# Patient Record
Sex: FEMALE | Race: WHITE | Employment: OTHER | URBAN - METROPOLITAN AREA
[De-identification: names, ages, dates, MRNs, and addresses within clinical notes are randomized per-mention and may not be internally consistent; named-entity substitution may affect disease eponyms.]

---

## 2020-08-24 ENCOUNTER — APPOINTMENT (OUTPATIENT)
Dept: CT IMAGING | Age: 65
DRG: 312 | End: 2020-08-24
Payer: MEDICARE

## 2020-08-24 ENCOUNTER — APPOINTMENT (OUTPATIENT)
Dept: GENERAL RADIOLOGY | Age: 65
DRG: 312 | End: 2020-08-24
Payer: MEDICARE

## 2020-08-24 ENCOUNTER — HOSPITAL ENCOUNTER (OUTPATIENT)
Age: 65
Setting detail: OBSERVATION
Discharge: HOME | DRG: 312 | End: 2020-08-27
Attending: EMERGENCY MEDICINE | Admitting: STUDENT IN AN ORGANIZED HEALTH CARE EDUCATION/TRAINING PROGRAM
Payer: MEDICARE

## 2020-08-24 PROBLEM — R07.9 CHEST PAIN: Status: ACTIVE | Noted: 2020-08-24

## 2020-08-24 LAB
ALBUMIN SERPL-MCNC: 4 GM/DL (ref 3.4–5)
ALP BLD-CCNC: 100 IU/L (ref 40–129)
ALT SERPL-CCNC: <5 U/L (ref 10–40)
ANION GAP SERPL CALCULATED.3IONS-SCNC: 13 MMOL/L (ref 4–16)
AST SERPL-CCNC: 10 IU/L (ref 15–37)
BASOPHILS ABSOLUTE: 0 K/CU MM
BASOPHILS RELATIVE PERCENT: 0.6 % (ref 0–1)
BILIRUB SERPL-MCNC: 0.2 MG/DL (ref 0–1)
BUN BLDV-MCNC: 13 MG/DL (ref 6–23)
CALCIUM SERPL-MCNC: 8.9 MG/DL (ref 8.3–10.6)
CHLORIDE BLD-SCNC: 98 MMOL/L (ref 99–110)
CO2: 19 MMOL/L (ref 21–32)
CREAT SERPL-MCNC: 0.8 MG/DL (ref 0.6–1.1)
DIFFERENTIAL TYPE: ABNORMAL
EOSINOPHILS ABSOLUTE: 0.1 K/CU MM
EOSINOPHILS RELATIVE PERCENT: 1 % (ref 0–3)
GFR AFRICAN AMERICAN: >60 ML/MIN/1.73M2
GFR NON-AFRICAN AMERICAN: >60 ML/MIN/1.73M2
GLUCOSE BLD-MCNC: 114 MG/DL (ref 70–99)
HCT VFR BLD CALC: 40.3 % (ref 37–47)
HEMOGLOBIN: 13.9 GM/DL (ref 12.5–16)
IMMATURE NEUTROPHIL %: 0.2 % (ref 0–0.43)
INR BLD: 1.1 INDEX
LYMPHOCYTES ABSOLUTE: 2.1 K/CU MM
LYMPHOCYTES RELATIVE PERCENT: 40.9 % (ref 24–44)
MCH RBC QN AUTO: 32.9 PG (ref 27–31)
MCHC RBC AUTO-ENTMCNC: 34.5 % (ref 32–36)
MCV RBC AUTO: 95.3 FL (ref 78–100)
MONOCYTES ABSOLUTE: 0.5 K/CU MM
MONOCYTES RELATIVE PERCENT: 10.1 % (ref 0–4)
PDW BLD-RTO: 12.7 % (ref 11.7–14.9)
PLATELET # BLD: 303 K/CU MM (ref 140–440)
PMV BLD AUTO: 8.6 FL (ref 7.5–11.1)
POTASSIUM SERPL-SCNC: 3.5 MMOL/L (ref 3.5–5.1)
PROTHROMBIN TIME: 14.1 SECONDS (ref 11.7–14.5)
RBC # BLD: 4.23 M/CU MM (ref 4.2–5.4)
SEGMENTED NEUTROPHILS ABSOLUTE COUNT: 2.4 K/CU MM
SEGMENTED NEUTROPHILS RELATIVE PERCENT: 47.2 % (ref 36–66)
SODIUM BLD-SCNC: 130 MMOL/L (ref 135–145)
TOTAL IMMATURE NEUTOROPHIL: 0.01 K/CU MM
TOTAL PROTEIN: 6.5 GM/DL (ref 6.4–8.2)
TROPONIN T: <0.01 NG/ML
WBC # BLD: 5.1 K/CU MM (ref 4–10.5)

## 2020-08-24 PROCEDURE — G0378 HOSPITAL OBSERVATION PER HR: HCPCS

## 2020-08-24 PROCEDURE — 6360000004 HC RX CONTRAST MEDICATION: Performed by: EMERGENCY MEDICINE

## 2020-08-24 PROCEDURE — 71275 CT ANGIOGRAPHY CHEST: CPT

## 2020-08-24 PROCEDURE — 99285 EMERGENCY DEPT VISIT HI MDM: CPT

## 2020-08-24 PROCEDURE — 71045 X-RAY EXAM CHEST 1 VIEW: CPT

## 2020-08-24 PROCEDURE — 84484 ASSAY OF TROPONIN QUANT: CPT

## 2020-08-24 PROCEDURE — 80053 COMPREHEN METABOLIC PANEL: CPT

## 2020-08-24 PROCEDURE — 85025 COMPLETE CBC W/AUTO DIFF WBC: CPT

## 2020-08-24 PROCEDURE — 85610 PROTHROMBIN TIME: CPT

## 2020-08-24 PROCEDURE — 93005 ELECTROCARDIOGRAM TRACING: CPT | Performed by: EMERGENCY MEDICINE

## 2020-08-24 RX ORDER — OXCARBAZEPINE 300 MG/1
300 TABLET, FILM COATED ORAL 2 TIMES DAILY
COMMUNITY

## 2020-08-24 RX ORDER — ZONISAMIDE 100 MG/1
100 CAPSULE ORAL 2 TIMES DAILY
COMMUNITY

## 2020-08-24 RX ORDER — LEVETIRACETAM 750 MG/1
750 TABLET ORAL 2 TIMES DAILY
COMMUNITY

## 2020-08-24 RX ADMIN — IOPAMIDOL 75 ML: 755 INJECTION, SOLUTION INTRAVENOUS at 19:25

## 2020-08-24 NOTE — ED PROVIDER NOTES
eMERGENCY dEPARTMENT eNCOUnter      PCP: No primary care provider on file. CHIEF COMPLAINT    Chief Complaint   Patient presents with    Dizziness     for 1 month       HPI    Luciano Stone is a 62 y.o. female who presents with near syncope. States that she was shopping at the store when she started feeling lightheaded like she might go down. There were people around her which helped her onto the edge should have something to sit down. She states she does not think she fully passed out. She is a poor historian but daughter at the bedside states that she was called because she was reportedly complaining of chest pressure. She states she does think she had chest pressure when she awoke, states she had muscle aches in her arms. She does have history of seizure, they did not report any seizure-like activity. Family present to have witnessed her seizures in the past stated it did not appear to be this. She denies any recent travel, hospitalization, recent surgery. No history of leg pain or swelling. No history of PE. Denies cardiac history. Other time within the last month and that time had a prodrome of nausea and lightheadedness. REVIEW OF SYSTEMS    Constitutional:  Denies fever, chills. HENT:  Denies sore throat or ear pain   Cardiovascular:  + chest pain, denies palpitations   Respiratory:  Denies cough or shortness of breath    GI:  Denies abdominal pain, nausea, vomiting, or diarrhea  :  Denies any urinary symptoms, flank pain  Musculoskeletal:  Denies back pain, extremity pain  Skin:  Denies rash, color change  Neurologic:  Denies headache, focal weakness or sensory changes   Lymphatic:  Denies swollen glands, edema    All other review of systems are negative  See HPI and nursing notes for additional information     PAST MEDICAL AND SURGICAL HISTORY    Past Medical History:   Diagnosis Date    Seizures ([facility] [address]      History reviewed. No pertinent surgical history.     CURRENT MEDICATIONS    Current Outpatient Rx   Medication Sig Dispense Refill    zonisamide (ZONEGRAN) 100 MG capsule Take 100 mg by mouth daily      Ipratropium-Albuterol (COMBIVENT IN) Inhale into the lungs      levETIRAcetam (KEPPRA) 750 MG tablet Take 750 mg by mouth 2 times daily      OXcarbazepine (TRILEPTAL) 300 MG tablet Take 300 mg by mouth 2 times daily         ALLERGIES    No Known Allergies    SOCIAL AND FAMILY HISTORY    Social History     Socioeconomic History    Marital status: None     Spouse name: None    Number of children: None    Years of education: None    Highest education level: None   Occupational History    None   Social Needs    Financial resource strain: None    Food insecurity     Worry: None     Inability: None    Transportation needs     Medical: None     Non-medical: None   Tobacco Use    Smoking status: None    Smokeless tobacco: Never Used   Substance and Sexual Activity    Alcohol use: Not Currently    Drug use: Not Currently    Sexual activity: None   Lifestyle    Physical activity     Days per week: None     Minutes per session: None    Stress: None   Relationships    Social connections     Talks on phone: None     Gets together: None     Attends Mormonism service: None     Active member of club or organization: None     Attends meetings of clubs or organizations: None     Relationship status: None    Intimate partner violence     Fear of current or ex partner: None     Emotionally abused: None     Physically abused: None     Forced sexual activity: None   Other Topics Concern    None   Social History Narrative    None     History reviewed. No pertinent family history. PHYSICAL EXAM    VITAL SIGNS: BP (!) 177/80   Pulse 61   Temp 98.6 °F (37 °C) (Temporal)   Resp 18   Ht 5' 6.5\" (1.689 m)   Wt 157 lb (71.2 kg)   SpO2 99%   BMI 24.96 kg/m²    Constitutional:  Well developed, No acute distress. HENT:  Normocephalic, Atraumatic, PERRL. EOMI.   Sclera evidence of intraluminal filling defect to suggest pulmonary embolism. Main pulmonary artery is normal in caliber. Mediastinum: No evidence of mediastinal lymphadenopathy. The heart and pericardium demonstrate no acute abnormality. There is no acute abnormality of the thoracic aorta. Lungs/pleura: The lungs are without acute process. No focal consolidation or pulmonary edema. No evidence of pleural effusion or pneumothorax. Upper Abdomen: Limited images of the upper abdomen are unremarkable. Soft Tissues/Bones: No acute bone or soft tissue abnormality. No evidence of pulmonary embolism or acute pulmonary abnormality. ED COURSE & MEDICAL DECISION MAKING       Vital signs and nursing notes reviewed during ED course. All pertinent Lab data and radiographic results reviewed with patient at bedside. The patient and/or the family were informed of the results of any tests/labs/imaging, the treatment plan, and time was allotted to answer questions. 62yo female presenting with what is described as a near syncopal episode with associated chest pressure. Back to baseline on evaluation. Is well appearing, nontoxic. Workup was initiated. Currently awaiting lab and imaging results. Care will be signed out to the oncoming provider pending results for disposition.      Clinical  IMPRESSION    Near syncope, chest pain           (Please note the MDM and HPI sections of this note were completed with a voice recognition program.  Efforts were made to edit the dictations but occasionally words are mis-transcribed.)      Jacinto Rivera DO  08/25/20 7922

## 2020-08-24 NOTE — ED TRIAGE NOTES
Patient arrived to ED via walk in for dizziness and syncopal episode at store today. Patient has a history of seizures and dizziness.

## 2020-08-25 ENCOUNTER — APPOINTMENT (OUTPATIENT)
Dept: NUCLEAR MEDICINE | Age: 65
DRG: 312 | End: 2020-08-25
Payer: MEDICARE

## 2020-08-25 LAB
ANION GAP SERPL CALCULATED.3IONS-SCNC: 9 MMOL/L (ref 4–16)
BUN BLDV-MCNC: 9 MG/DL (ref 6–23)
CALCIUM SERPL-MCNC: 9.1 MG/DL (ref 8.3–10.6)
CHLORIDE BLD-SCNC: 101 MMOL/L (ref 99–110)
CO2: 22 MMOL/L (ref 21–32)
CREAT SERPL-MCNC: 0.8 MG/DL (ref 0.6–1.1)
GFR AFRICAN AMERICAN: >60 ML/MIN/1.73M2
GFR NON-AFRICAN AMERICAN: >60 ML/MIN/1.73M2
GLUCOSE BLD-MCNC: 126 MG/DL (ref 70–99)
LV EF: 53 %
LV EF: 71 %
LVEF MODALITY: NORMAL
LVEF MODALITY: NORMAL
POTASSIUM SERPL-SCNC: 4.1 MMOL/L (ref 3.5–5.1)
SODIUM BLD-SCNC: 132 MMOL/L (ref 135–145)
T4 FREE: 1.02 NG/DL (ref 0.9–1.8)
TROPONIN T: <0.01 NG/ML
TROPONIN T: <0.01 NG/ML
TSH HIGH SENSITIVITY: 2.04 UIU/ML (ref 0.27–4.2)

## 2020-08-25 PROCEDURE — 93017 CV STRESS TEST TRACING ONLY: CPT

## 2020-08-25 PROCEDURE — 84484 ASSAY OF TROPONIN QUANT: CPT

## 2020-08-25 PROCEDURE — G0378 HOSPITAL OBSERVATION PER HR: HCPCS

## 2020-08-25 PROCEDURE — 78452 HT MUSCLE IMAGE SPECT MULT: CPT

## 2020-08-25 PROCEDURE — 93306 TTE W/DOPPLER COMPLETE: CPT

## 2020-08-25 PROCEDURE — 36415 COLL VENOUS BLD VENIPUNCTURE: CPT

## 2020-08-25 PROCEDURE — 6360000002 HC RX W HCPCS: Performed by: HOSPITALIST

## 2020-08-25 PROCEDURE — 6370000000 HC RX 637 (ALT 250 FOR IP): Performed by: HOSPITALIST

## 2020-08-25 PROCEDURE — 93010 ELECTROCARDIOGRAM REPORT: CPT | Performed by: INTERNAL MEDICINE

## 2020-08-25 PROCEDURE — 3430000000 HC RX DIAGNOSTIC RADIOPHARMACEUTICAL: Performed by: INTERNAL MEDICINE

## 2020-08-25 PROCEDURE — 84439 ASSAY OF FREE THYROXINE: CPT

## 2020-08-25 PROCEDURE — 80048 BASIC METABOLIC PNL TOTAL CA: CPT

## 2020-08-25 PROCEDURE — 84443 ASSAY THYROID STIM HORMONE: CPT

## 2020-08-25 PROCEDURE — 99204 OFFICE O/P NEW MOD 45 MIN: CPT | Performed by: INTERNAL MEDICINE

## 2020-08-25 PROCEDURE — 2580000003 HC RX 258: Performed by: HOSPITALIST

## 2020-08-25 PROCEDURE — 96372 THER/PROPH/DIAG INJ SC/IM: CPT

## 2020-08-25 PROCEDURE — A9500 TC99M SESTAMIBI: HCPCS | Performed by: INTERNAL MEDICINE

## 2020-08-25 PROCEDURE — 6360000002 HC RX W HCPCS: Performed by: INTERNAL MEDICINE

## 2020-08-25 RX ORDER — ASPIRIN 81 MG/1
81 TABLET, CHEWABLE ORAL DAILY
Status: DISCONTINUED | OUTPATIENT
Start: 2020-08-26 | End: 2020-08-27 | Stop reason: HOSPADM

## 2020-08-25 RX ORDER — ACETAMINOPHEN 325 MG/1
650 TABLET ORAL EVERY 6 HOURS PRN
Status: DISCONTINUED | OUTPATIENT
Start: 2020-08-25 | End: 2020-08-27 | Stop reason: HOSPADM

## 2020-08-25 RX ORDER — OXYCODONE HYDROCHLORIDE AND ACETAMINOPHEN 5; 325 MG/1; MG/1
1 TABLET ORAL EVERY 6 HOURS PRN
Status: DISCONTINUED | OUTPATIENT
Start: 2020-08-25 | End: 2020-08-27 | Stop reason: HOSPADM

## 2020-08-25 RX ORDER — ZONISAMIDE 100 MG/1
100 CAPSULE ORAL DAILY
Status: DISCONTINUED | OUTPATIENT
Start: 2020-08-25 | End: 2020-08-26

## 2020-08-25 RX ORDER — ONDANSETRON 2 MG/ML
4 INJECTION INTRAMUSCULAR; INTRAVENOUS EVERY 6 HOURS PRN
Status: DISCONTINUED | OUTPATIENT
Start: 2020-08-25 | End: 2020-08-27 | Stop reason: HOSPADM

## 2020-08-25 RX ORDER — POLYETHYLENE GLYCOL 3350 17 G/17G
17 POWDER, FOR SOLUTION ORAL DAILY PRN
Status: DISCONTINUED | OUTPATIENT
Start: 2020-08-25 | End: 2020-08-27 | Stop reason: HOSPADM

## 2020-08-25 RX ORDER — ATORVASTATIN CALCIUM 40 MG/1
40 TABLET, FILM COATED ORAL NIGHTLY
Status: DISCONTINUED | OUTPATIENT
Start: 2020-08-25 | End: 2020-08-26

## 2020-08-25 RX ORDER — OXCARBAZEPINE 300 MG/1
300 TABLET, FILM COATED ORAL 2 TIMES DAILY
Status: DISCONTINUED | OUTPATIENT
Start: 2020-08-25 | End: 2020-08-26

## 2020-08-25 RX ORDER — ACETAMINOPHEN 650 MG/1
650 SUPPOSITORY RECTAL EVERY 6 HOURS PRN
Status: DISCONTINUED | OUTPATIENT
Start: 2020-08-25 | End: 2020-08-27 | Stop reason: HOSPADM

## 2020-08-25 RX ORDER — SODIUM CHLORIDE 0.9 % (FLUSH) 0.9 %
10 SYRINGE (ML) INJECTION PRN
Status: DISCONTINUED | OUTPATIENT
Start: 2020-08-25 | End: 2020-08-27 | Stop reason: HOSPADM

## 2020-08-25 RX ORDER — SODIUM CHLORIDE 9 MG/ML
INJECTION, SOLUTION INTRAVENOUS ONCE
Status: COMPLETED | OUTPATIENT
Start: 2020-08-25 | End: 2020-08-25

## 2020-08-25 RX ORDER — PROMETHAZINE HYDROCHLORIDE 25 MG/1
12.5 TABLET ORAL EVERY 6 HOURS PRN
Status: DISCONTINUED | OUTPATIENT
Start: 2020-08-25 | End: 2020-08-27 | Stop reason: HOSPADM

## 2020-08-25 RX ORDER — SODIUM CHLORIDE 0.9 % (FLUSH) 0.9 %
10 SYRINGE (ML) INJECTION EVERY 12 HOURS SCHEDULED
Status: DISCONTINUED | OUTPATIENT
Start: 2020-08-25 | End: 2020-08-27 | Stop reason: HOSPADM

## 2020-08-25 RX ADMIN — SODIUM CHLORIDE, PRESERVATIVE FREE 10 ML: 5 INJECTION INTRAVENOUS at 20:10

## 2020-08-25 RX ADMIN — OXYCODONE HYDROCHLORIDE AND ACETAMINOPHEN 1 TABLET: 5; 325 TABLET ORAL at 18:19

## 2020-08-25 RX ADMIN — REGADENOSON 0.4 MG: 0.08 INJECTION, SOLUTION INTRAVENOUS at 13:09

## 2020-08-25 RX ADMIN — Medication 10 MILLICURIE: at 14:28

## 2020-08-25 RX ADMIN — ENOXAPARIN SODIUM 40 MG: 40 INJECTION SUBCUTANEOUS at 15:18

## 2020-08-25 RX ADMIN — ZONISAMIDE 100 MG: 100 CAPSULE ORAL at 15:30

## 2020-08-25 RX ADMIN — SODIUM CHLORIDE: 9 INJECTION, SOLUTION INTRAVENOUS at 15:18

## 2020-08-25 RX ADMIN — SODIUM CHLORIDE, PRESERVATIVE FREE 10 ML: 5 INJECTION INTRAVENOUS at 15:19

## 2020-08-25 RX ADMIN — LEVETIRACETAM 750 MG: 500 TABLET, FILM COATED ORAL at 20:10

## 2020-08-25 RX ADMIN — Medication 30 MILLICURIE: at 14:27

## 2020-08-25 RX ADMIN — OXCARBAZEPINE 300 MG: 300 TABLET, FILM COATED ORAL at 20:10

## 2020-08-25 RX ADMIN — ATORVASTATIN CALCIUM 40 MG: 40 TABLET, FILM COATED ORAL at 20:10

## 2020-08-25 RX ADMIN — OXCARBAZEPINE 300 MG: 300 TABLET, FILM COATED ORAL at 15:18

## 2020-08-25 RX ADMIN — LEVETIRACETAM 750 MG: 500 TABLET, FILM COATED ORAL at 15:18

## 2020-08-25 ASSESSMENT — PAIN DESCRIPTION - PAIN TYPE: TYPE: ACUTE PAIN

## 2020-08-25 ASSESSMENT — PAIN SCALES - GENERAL
PAINLEVEL_OUTOF10: 0
PAINLEVEL_OUTOF10: 0
PAINLEVEL_OUTOF10: 4
PAINLEVEL_OUTOF10: 1

## 2020-08-25 ASSESSMENT — PAIN DESCRIPTION - LOCATION: LOCATION: HEAD

## 2020-08-25 ASSESSMENT — PAIN DESCRIPTION - DESCRIPTORS: DESCRIPTORS: HEADACHE

## 2020-08-25 NOTE — H&P
file    Transportation needs     Medical: Not on file     Non-medical: Not on file   Tobacco Use    Smoking status: Not on file    Smokeless tobacco: Never Used   Substance and Sexual Activity    Alcohol use: Not Currently    Drug use: Not Currently    Sexual activity: Not on file   Lifestyle    Physical activity     Days per week: Not on file     Minutes per session: Not on file    Stress: Not on file   Relationships    Social connections     Talks on phone: Not on file     Gets together: Not on file     Attends Rastafarian service: Not on file     Active member of club or organization: Not on file     Attends meetings of clubs or organizations: Not on file     Relationship status: Not on file    Intimate partner violence     Fear of current or ex partner: Not on file     Emotionally abused: Not on file     Physically abused: Not on file     Forced sexual activity: Not on file   Other Topics Concern    Not on file   Social History Narrative    Not on file         Family History:   History reviewed. No pertinent family history.   Family hx of CAD in parents and brothers   REVIEW OF SYSTEMS:  CONSTITUTIONAL:  negative  EYES:  negative  HEENT:  negative  RESPIRATORY:  negative  CARDIOVASCULAR:  negative  GASTROINTESTINAL:  negative  GENITOURINARY:  negative  ENDOCRINE:  negative  MUSCULOSKELETAL:  negative  NEUROLOGICAL:  negative  BEHAVIOR/PSYCH:  negative  PHYSICAL EXAM:    Vitals:  BP (!) 150/73   Pulse 51   Temp 98 °F (36.7 °C) (Oral)   Resp 15   Ht 5' 6.5\" (1.689 m)   Wt 157 lb (71.2 kg)   SpO2 99%   BMI 24.96 kg/m²     CONSTITUTIONAL:  awake  EYES:  lids and lashes normal  ENT:  normocepalic, without obvious abnormality  LUNGS:  No increased work of breathing, good air exchange, clear to auscultation bilaterally, no crackles or wheezing  CARDIOVASCULAR:  bradycardic  ABDOMEN:  No scars, normal bowel sounds, soft, non-distended, non-tender, no masses palpated, no hepatosplenomegally  MUSCULOSKELETAL:  full range of motion noted  NEUROLOGIC:  Awake, alert, oriented to name, place and time. Cranial nerves II-XII are grossly intact. Motor is 5 out of 5 bilaterally. SKIN:  no bruising or bleeding    DATA:  CT PE  No evidence of pulmonary embolism or acute pulmonary abnormality.    EKG SB 59  ASSESSMENT AND PLAN:    Chest pain with syncope  -serial CE, ASA, lipid, TSH  -echo and orthostatic  -Consult cardio  Bradycardia  -TSH and echo  Hyponatremia  -IVF and check orthostatics  Seizure  -zonisamide, trileptal, keppra  DVT prophylaxis  -lovenox

## 2020-08-25 NOTE — CONSULTS
CARDIOLOGY CONSULT NOTE    Reason for consultation: Chest pain     Referring physician: Liv Hayes MD      Primary care physician: Araceli Odonnell        Dear Liv Hayes MD   Thanks for the consult.     History of present illness:Missy is a 62 y. o.year old who was at grocery store and almost passed out, she was put on the chair and she was not answering question, she is not sure if she had she had seizures, it lasted for few minutes, she is on Keppra, her last last know seizure was fews ago, when she went home, she had chest pain \, intermittent for 15 to 20 mins and aggravated with activity substernal also,reproducible with palpation, radiated to shoulder, 6/10, tender to touch,associated with shortness of breath, + sweating, nausea, did not get NTG in ED. No fever, no chills, no nausea no vomiting. Blood pressure, cholesterol, blood glucose and weight are well controlled.     Chief Complaint   Patient presents with    Dizziness     for 1 month       Past medical history:    has a past medical history of Seizures ([address] Past surgical history: none  Social History:   does not have a smoking history on file. She has never used smokeless tobacco. She reports previous alcohol use. She reports previous drug use.   Family history:   no family history of CAD, STROKE of DM    levETIRAcetam (KEPPRA) tablet 750 mg, BID  OXcarbazepine (TRILEPTAL) tablet 300 mg, BID  zonisamide (ZONEGRAN) capsule 100 mg, Daily  sodium chloride flush 0.9 % injection 10 mL, 2 times per day  sodium chloride flush 0.9 % injection 10 mL, PRN  acetaminophen (TYLENOL) tablet 650 mg, Q6H PRN    Or  acetaminophen (TYLENOL) suppository 650 mg, Q6H PRN  polyethylene glycol (GLYCOLAX) packet 17 g, Daily PRN  promethazine (PHENERGAN) tablet 12.5 mg, Q6H PRN    Or  ondansetron (ZOFRAN) injection 4 mg, Q6H PRN  atorvastatin (LIPITOR) tablet 40 mg, Nightly  [START ON 8/26/2020] aspirin chewable tablet 81 mg, Daily  enoxaparin (LOVENOX) injection 40 mg, Daily  0.9 % sodium chloride infusion, Once      Current Facility-Administered Medications   Medication Dose Route Frequency Provider Last Rate Last Dose    levETIRAcetam (KEPPRA) tablet 750 mg  750 mg Oral BID Anastasia West MD        OXcarbazepine (TRILEPTAL) tablet 300 mg  300 mg Oral BID Anastasia West MD        zonisamide (ZONEGRAN) capsule 100 mg  100 mg Oral Daily Anastasia West MD        sodium chloride flush 0.9 % injection 10 mL  10 mL Intravenous 2 times per day Anastasia West MD        sodium chloride flush 0.9 % injection 10 mL  10 mL Intravenous PRN Anastasia West MD        acetaminophen (TYLENOL) tablet 650 mg  650 mg Oral Q6H PRN Anastasia West MD        Or    acetaminophen (TYLENOL) suppository 650 mg  650 mg Rectal Q6H PRN Anastasia West MD        polyethylene glycol (GLYCOLAX) packet 17 g  17 g Oral Daily PRN Anastasia West MD        promethazine (PHENERGAN) tablet 12.5 mg  12.5 mg Oral Q6H PRN Anastasia West MD        Or    ondansetron (ZOFRAN) injection 4 mg  4 mg Intravenous Q6H PRN Anastasia West MD        atorvastatin (LIPITOR) tablet 40 mg  40 mg Oral Nightly Anastasia West MD       [facility] ON 8/26/2020] aspirin chewable tablet 81 mg  81 mg Oral Daily Anastasia West MD        enoxaparin (LOVENOX) injection 40 mg  40 mg Subcutaneous Daily Anastasia West MD        0.9 % sodium chloride infusion   Intravenous Once Anastasia West MD              Review of Systems:    · Constitutional: No Fever or Weight Loss    · Eyes: No Decreased Vision  · ENT: No Headaches, Hearing Loss or Vertigo  · Cardiovascular: + chest pain, dyspnea on exertion, palpitations or loss of consciousness  · Respiratory: No cough or wheezing    · Gastrointestinal: No abdominal pain, appetite loss, blood in stools, constipation, diarrhea or heartburn  · Genitourinary: No dysuria, trouble voiding, or hematuria  · Musculoskeletal: No gait disturbance, weakness or joint complaints  · Integumentary: No rash or pruritis  · Neurological: No TIA or stroke symptoms  · Psychiatric: No anxiety or depression  · Endocrine: No malaise, fatigue or temperature intolerance  · Hematologic/Lymphatic: No bleeding problems, blood clots or swollen lymph nodes  · Allergic/Immunologic: No nasal congestion or hives  All systems negative except as marked.      ·    ·    ·      Physical Examination:    Vitals:    08/25/20 1017   BP: 150/73   Pulse: 51   Resp: 15   Temp:    SpO2: 99%      Wt Readings from Last 3 Encounters:   08/24/20 157 lb (71.2 kg)     Body mass index is 24.96 kg/m².        General Appearance: No distress, conversant     Constitutional: Well developed, Well nourished, No acute distress, Non-toxic appearance.    HENT:  Normocephalic, Atraumatic, Bilateral external ears normal, Oropharynx moist, No oral exudates, Nose normal. Neck- Normal range of motion, No tenderness, Supple, No stridor,no apical-carotid delay, no carotid bruit  Eyes: PERRL, EOMI, Conjunctiva normal, No discharge.    Respiratory:  Normal breath sounds, No respiratory distress, No wheezing, No chest tenderness. ,no use of accessory muscles, diaphragm movement is normal  Cardiovascular: (PMI) apex non displaced,no lifts no thrills, no s3,no s4, Normal heart rate, Normal rhythm, No murmurs, No rubs, No gallops. Carotid arteries pulse and amplitude are normal no bruit, no abdominal bruit noted ( normal abdominal aorta ausculation), femoral arteries pulse and amplitude are normal no bruit, pedal pulses are normal.  GI: Bowel sounds normal, Soft, No tenderness, No masses, No pulsatile masses, no hepatosplenomegally, no bruits  : External genitalia appear normal, No masses or lesions. No discharge. No CVA tenderness.    Musculoskeletal: Intact distal pulses, No edema, No tenderness, No cyanosis, No clubbing. Good range of motion in all major joints. No tenderness to palpation or major deformities noted. Back- No tenderness. Integument: Warm, Dry, No erythema, No rash.    Skin: no rash, no ulcers  Lymphatic: No lymphadenopathy noted.    Neurologic: Alert & oriented x 3, Normal motor function, Normal sensory function, No focal deficits noted.    Psychiatric: Affect normal, Judgment normal, Mood normal.   Lab Review        Recent Labs       09/28/16 0010    WBC  12.3*    HGB  14.4    HCT  43.8    PLT  316            Recent Labs       09/28/16 0010    NA  136    K  3.9    CL  95*    CO2  23    BUN  10    CREATININE  0.8           Recent Labs       09/28/16 0010    AST  12*    ALT  10    BILITOT  0.4    ALKPHOS  124       No results for input(s): TROPONINI in the last 72 hours. No results found for: BNP  No results found for: INR, PROTIME        EKG:nsr     Chest Xray:nad     ECHO:pending  Labs, echo, meds reviewed  Assessment:      Recommendations:     1. Possible recurrent seizures: recommend to optimize seizure medications  2. Chest pain: stress test and echo ordered  3.  Health maintenance: exerise and diet  All labs, medications and tests reviewed, continue all other medications of all above medical condition listed as is.          Jose Martini MD,   Jose Martini MD, 8/25/2020 11:14 AM

## 2020-08-25 NOTE — ED NOTES
Report was called to Cas Lopez at [facility] and report was given to the Med Trans crew. The patient was alert, oriented and had stable vital signs when she left this facility. Family was notified of the earlier transfer time. The patient went with her purse, glasses, pants, shoes, socks, shirt, and undergarments.       Shyam Bates RN  08/25/20 3259

## 2020-08-25 NOTE — ED NOTES
Received report from [facility] and assumed care of this patient. She presented to the ED yesterday with her family after a near syncopal episode while shopping. The patient and family are also reporting some dizziness for a month and chest pain. The patient is a poor historian but a daughter was present when the patient initially came in however the daughter is not here now. There is also history of a seizure disorder but the family reports that she did not appear to have a seizure.                 Maria M Flannery RN  08/25/20 1720

## 2020-08-26 LAB
CHOLESTEROL: 138 MG/DL
CORTISOL - AM: 7.9 UG/DL (ref 6–18.4)
HCT VFR BLD CALC: 38.1 % (ref 37–47)
HDLC SERPL-MCNC: 53 MG/DL
HEMOGLOBIN: 12.8 GM/DL (ref 12.5–16)
LDL CHOLESTEROL DIRECT: 71 MG/DL
MCH RBC QN AUTO: 32.7 PG (ref 27–31)
MCHC RBC AUTO-ENTMCNC: 33.6 % (ref 32–36)
MCV RBC AUTO: 97.2 FL (ref 78–100)
PDW BLD-RTO: 12.6 % (ref 11.7–14.9)
PLATELET # BLD: 282 K/CU MM (ref 140–440)
PMV BLD AUTO: 8.2 FL (ref 7.5–11.1)
RBC # BLD: 3.92 M/CU MM (ref 4.2–5.4)
TRIGL SERPL-MCNC: 77 MG/DL
TROPONIN T: <0.01 NG/ML
WBC # BLD: 4.5 K/CU MM (ref 4–10.5)

## 2020-08-26 PROCEDURE — APPSS15 APP SPLIT SHARED TIME 0-15 MINUTES: Performed by: NURSE PRACTITIONER

## 2020-08-26 PROCEDURE — 99225 PR SBSQ OBSERVATION CARE/DAY 25 MINUTES: CPT | Performed by: INTERNAL MEDICINE

## 2020-08-26 PROCEDURE — 1200000000 HC SEMI PRIVATE

## 2020-08-26 PROCEDURE — G0378 HOSPITAL OBSERVATION PER HR: HCPCS

## 2020-08-26 PROCEDURE — 94640 AIRWAY INHALATION TREATMENT: CPT

## 2020-08-26 PROCEDURE — 36415 COLL VENOUS BLD VENIPUNCTURE: CPT

## 2020-08-26 PROCEDURE — 94761 N-INVAS EAR/PLS OXIMETRY MLT: CPT

## 2020-08-26 PROCEDURE — 6370000000 HC RX 637 (ALT 250 FOR IP): Performed by: HOSPITALIST

## 2020-08-26 PROCEDURE — 2580000003 HC RX 258: Performed by: HOSPITALIST

## 2020-08-26 PROCEDURE — 83721 ASSAY OF BLOOD LIPOPROTEIN: CPT

## 2020-08-26 PROCEDURE — 80061 LIPID PANEL: CPT

## 2020-08-26 PROCEDURE — 82533 TOTAL CORTISOL: CPT

## 2020-08-26 PROCEDURE — 6360000002 HC RX W HCPCS: Performed by: HOSPITALIST

## 2020-08-26 PROCEDURE — 85027 COMPLETE CBC AUTOMATED: CPT

## 2020-08-26 PROCEDURE — 84484 ASSAY OF TROPONIN QUANT: CPT

## 2020-08-26 RX ORDER — IPRATROPIUM BROMIDE AND ALBUTEROL SULFATE 2.5; .5 MG/3ML; MG/3ML
1 SOLUTION RESPIRATORY (INHALATION)
Status: DISCONTINUED | OUTPATIENT
Start: 2020-08-26 | End: 2020-08-27 | Stop reason: HOSPADM

## 2020-08-26 RX ORDER — OXCARBAZEPINE 300 MG/1
900 TABLET, FILM COATED ORAL EVERY EVENING
Status: DISCONTINUED | OUTPATIENT
Start: 2020-08-26 | End: 2020-08-27 | Stop reason: HOSPADM

## 2020-08-26 RX ORDER — LOVASTATIN 20 MG/1
20 TABLET ORAL NIGHTLY
Status: DISCONTINUED | OUTPATIENT
Start: 2020-08-26 | End: 2020-08-26 | Stop reason: CLARIF

## 2020-08-26 RX ORDER — SERTRALINE HYDROCHLORIDE 100 MG/1
100 TABLET, FILM COATED ORAL DAILY
Status: DISCONTINUED | OUTPATIENT
Start: 2020-08-26 | End: 2020-08-27 | Stop reason: HOSPADM

## 2020-08-26 RX ORDER — ALENDRONATE SODIUM 35 MG/1
35 TABLET ORAL
COMMUNITY

## 2020-08-26 RX ORDER — MIDODRINE HYDROCHLORIDE 5 MG/1
2.5 TABLET ORAL
Status: DISCONTINUED | OUTPATIENT
Start: 2020-08-26 | End: 2020-08-27

## 2020-08-26 RX ORDER — SODIUM CHLORIDE 9 MG/ML
INJECTION, SOLUTION INTRAVENOUS ONCE
Status: COMPLETED | OUTPATIENT
Start: 2020-08-26 | End: 2020-08-26

## 2020-08-26 RX ORDER — OXYBUTYNIN CHLORIDE 15 MG/1
15 TABLET, EXTENDED RELEASE ORAL DAILY
COMMUNITY

## 2020-08-26 RX ORDER — ATORVASTATIN CALCIUM 10 MG/1
10 TABLET, FILM COATED ORAL DAILY
Status: DISCONTINUED | OUTPATIENT
Start: 2020-08-26 | End: 2020-08-27 | Stop reason: HOSPADM

## 2020-08-26 RX ORDER — LOVASTATIN 20 MG/1
20 TABLET ORAL NIGHTLY
COMMUNITY

## 2020-08-26 RX ORDER — LEVETIRACETAM 500 MG/1
1500 TABLET ORAL 2 TIMES DAILY
Status: DISCONTINUED | OUTPATIENT
Start: 2020-08-26 | End: 2020-08-27 | Stop reason: HOSPADM

## 2020-08-26 RX ORDER — SERTRALINE HYDROCHLORIDE 100 MG/1
100 TABLET, FILM COATED ORAL DAILY
COMMUNITY

## 2020-08-26 RX ORDER — ZONISAMIDE 100 MG/1
200 CAPSULE ORAL 2 TIMES DAILY
Status: DISCONTINUED | OUTPATIENT
Start: 2020-08-26 | End: 2020-08-27 | Stop reason: HOSPADM

## 2020-08-26 RX ORDER — OXCARBAZEPINE 300 MG/1
600 TABLET, FILM COATED ORAL
Status: DISCONTINUED | OUTPATIENT
Start: 2020-08-27 | End: 2020-08-27 | Stop reason: HOSPADM

## 2020-08-26 RX ADMIN — OXYBUTYNIN CHLORIDE 15 MG: 10 TABLET, EXTENDED RELEASE ORAL at 18:12

## 2020-08-26 RX ADMIN — ATORVASTATIN CALCIUM 10 MG: 10 TABLET, FILM COATED ORAL at 22:10

## 2020-08-26 RX ADMIN — ZONISAMIDE 100 MG: 100 CAPSULE ORAL at 08:13

## 2020-08-26 RX ADMIN — OXCARBAZEPINE 300 MG: 300 TABLET, FILM COATED ORAL at 08:13

## 2020-08-26 RX ADMIN — OXCARBAZEPINE 900 MG: 300 TABLET, FILM COATED ORAL at 22:10

## 2020-08-26 RX ADMIN — SODIUM CHLORIDE, PRESERVATIVE FREE 10 ML: 5 INJECTION INTRAVENOUS at 08:14

## 2020-08-26 RX ADMIN — ENOXAPARIN SODIUM 40 MG: 40 INJECTION SUBCUTANEOUS at 08:14

## 2020-08-26 RX ADMIN — MIDODRINE HYDROCHLORIDE 2.5 MG: 5 TABLET ORAL at 18:12

## 2020-08-26 RX ADMIN — SERTRALINE HYDROCHLORIDE 100 MG: 100 TABLET ORAL at 18:12

## 2020-08-26 RX ADMIN — SODIUM CHLORIDE, PRESERVATIVE FREE 10 ML: 5 INJECTION INTRAVENOUS at 22:11

## 2020-08-26 RX ADMIN — LEVETIRACETAM 1500 MG: 500 TABLET ORAL at 22:10

## 2020-08-26 RX ADMIN — IPRATROPIUM BROMIDE AND ALBUTEROL SULFATE 1 AMPULE: .5; 3 SOLUTION RESPIRATORY (INHALATION) at 20:00

## 2020-08-26 RX ADMIN — ASPIRIN 81 MG CHEWABLE TABLET 81 MG: 81 TABLET CHEWABLE at 08:13

## 2020-08-26 RX ADMIN — ZONISAMIDE 200 MG: 100 CAPSULE ORAL at 22:10

## 2020-08-26 RX ADMIN — LEVETIRACETAM 750 MG: 500 TABLET, FILM COATED ORAL at 08:13

## 2020-08-26 RX ADMIN — SODIUM CHLORIDE: 9 INJECTION, SOLUTION INTRAVENOUS at 08:14

## 2020-08-26 RX ADMIN — IPRATROPIUM BROMIDE AND ALBUTEROL SULFATE 1 AMPULE: .5; 3 SOLUTION RESPIRATORY (INHALATION) at 16:47

## 2020-08-26 ASSESSMENT — PAIN DESCRIPTION - ONSET: ONSET: ON-GOING

## 2020-08-26 ASSESSMENT — PAIN DESCRIPTION - DESCRIPTORS: DESCRIPTORS: ACHING

## 2020-08-26 ASSESSMENT — PAIN SCALES - GENERAL
PAINLEVEL_OUTOF10: 0
PAINLEVEL_OUTOF10: 5

## 2020-08-26 ASSESSMENT — PAIN DESCRIPTION - PROGRESSION: CLINICAL_PROGRESSION: NOT CHANGED

## 2020-08-26 ASSESSMENT — PAIN DESCRIPTION - PAIN TYPE: TYPE: CHRONIC PAIN

## 2020-08-26 ASSESSMENT — PAIN DESCRIPTION - LOCATION: LOCATION: BACK

## 2020-08-26 ASSESSMENT — PAIN DESCRIPTION - FREQUENCY: FREQUENCY: INTERMITTENT

## 2020-08-26 ASSESSMENT — PAIN DESCRIPTION - ORIENTATION: ORIENTATION: LOWER

## 2020-08-26 NOTE — PROGRESS NOTES
reviewed. 2. Possible recurrent seizures: will recommend to optimize seizures medications. Cardiology to sign off, please call if further consultation is needed. YossiDIGNA Barrientos CNP, 8/26/2020 12:28 PM     Please note this report has been partially produced using speech recognition software and may contain errors related to that system including errors in grammar, punctuation, and spelling, as well as words and phrases that may be inappropriate. If there are any questions or concerns please feel free to contact the dictating provider for clarification. I have seen ,spoken to  and examined this patient personally, independently of the nurse practitioner. I have reviewed the hospital care given to date and reviewed all pertinent labs and imaging. The plan was developed mutually at the time of the visit with the patient,  NP  and myself. I have spoken with patient, nursing staff and provided written and verbal instructions . The above note has been reviewed and I agree with the assessment, diagnosis, and treatment plan with changes made by me as follows     CARDIOLOGY ATTENDING ADDENDUM    HPI:  I have reviewed the above HPI  And agree with above   Kayce Remy is a 62 y. o.year old who and presents with had concerns including Dizziness (for 1 month).   Chief Complaint   Patient presents with    Dizziness     for 1 month     Interval history:  Cardiac testing are normal    Physical Exam:  General:  Awake, alert, NAD  Head:normal  Eye:normal  Neck:  No JVD   Chest:  Clear to auscultation, respiration easy  Cardiovascular:  RRR S1S2  Abdomen:   nontender  Extremities:  No edema  Pulses; palpable  Neuro: grossly normal      MEDICAL DECISION MAKING;    I agree with the above plan, which was planned by myself and discussed with NP.

## 2020-08-26 NOTE — PROGRESS NOTES
Radha Mendoza is a 62 y.o. female patient states has dizziness when getting up     Current Facility-Administered Medications   Medication Dose Route Frequency Provider Last Rate Last Dose    midodrine (PROAMATINE) tablet 2.5 mg  2.5 mg Oral TID  Mathew Smith MD        levETIRAcetam (KEPPRA) tablet 750 mg  750 mg Oral BID Mathew Smith MD   750 mg at 08/26/20 0813    OXcarbazepine (TRILEPTAL) tablet 300 mg  300 mg Oral BID Mathew Smith MD   300 mg at 08/26/20 0813    zonisamide (ZONEGRAN) capsule 100 mg  100 mg Oral Daily Mathew Smith MD   100 mg at 08/26/20 0813    sodium chloride flush 0.9 % injection 10 mL  10 mL Intravenous 2 times per day Mathew Smith MD   10 mL at 08/26/20 0325    sodium chloride flush 0.9 % injection 10 mL  10 mL Intravenous PRN Mathew Smith MD        acetaminophen (TYLENOL) tablet 650 mg  650 mg Oral Q6H PRN Mathew Smith MD        Or    acetaminophen (TYLENOL) suppository 650 mg  650 mg Rectal Q6H PRN Mathew Smith MD        polyethylene glycol (GLYCOLAX) packet 17 g  17 g Oral Daily PRN Mathew Smith MD        promethazine (PHENERGAN) tablet 12.5 mg  12.5 mg Oral Q6H PRN Mathew Smith MD        Or    ondansetron (ZOFRAN) injection 4 mg  4 mg Intravenous Q6H PRN Mathew Smith MD        atorvastatin (LIPITOR) tablet 40 mg  40 mg Oral Nightly Mathew Smith MD   40 mg at 08/25/20 2010    aspirin chewable tablet 81 mg  81 mg Oral Daily Karel Mccarty MD   81 mg at 08/26/20 0813    enoxaparin (LOVENOX) injection 40 mg  40 mg Subcutaneous Daily Mathew Smith MD   40 mg at 08/26/20 0814    oxyCODONE-acetaminophen (PERCOCET) 5-325 MG per tablet 1 tablet  1 tablet Oral Q6H PRN Mathew Smith MD   1 tablet at 08/25/20 1819     No Known Allergies  Active Problems:    Chest pain  Resolved Problems:    * No resolved hospital problems. *    Blood pressure (!) 108/59, pulse 60, temperature 98.5 °F (36.9 °C), temperature source Oral, resp.  rate 19, height 5' 6.5\" (1.689 m), weight 157 lb (71.2 kg), SpO2 97 %. Subjective:  Symptoms:  Stable. Diet:  Adequate intake. Pain:  She reports no pain. Objective:  General Appearance:  Comfortable. Vital signs: (most recent): Blood pressure (!) 108/59, pulse 60, temperature 98.5 °F (36.9 °C), temperature source Oral, resp. rate 19, height 5' 6.5\" (1.689 m), weight 157 lb (71.2 kg), SpO2 97 %. Vital signs are normal.  (Orthostatic ). HEENT: Normal HEENT exam.    Lungs:  Normal effort. Heart: Normal rate. Abdomen: Abdomen is soft. Bowel sounds are normal.     Extremities: Normal range of motion. Neurological: Patient is alert. Pupils:  Pupils are equal, round, and reactive to light. Skin:  Warm.       Assessment & Plan  Chest pain  -serial CE, ASA, lipid, TSH wnl  -echo and stress test neg  -Consult cardio  Syncope   -seizure in differential but she denies any episodes and complains more of dizziness and noted severely orthostatic from  to 89  -cortisol and add midodrine  -monitor for any seizure like acitivity  Bradycardia  -TSH and echo wnl  Hyponatremia  -IVF and stable  Seizure  -zonisamide, trileptal, keppra  DVT prophylaxis  -lovenox    Orthostatic and midodrine  Julienne Apgar, MD  2020

## 2020-08-26 NOTE — PROGRESS NOTES
Admission skin assessment done with Caroline SANON. No open areas or skin problems noted. Skin intact.

## 2020-08-26 NOTE — CARE COORDINATION
Chart reviewed, CM called and spoke with patient on the hospital phone for dc planning. Introduced self and reason for call. Pt was admitted for CP. States she lives at home alone and is independent with her ADLs. Explained she follows with PCP and neurologist, has insurance with affordable RX co-pays and reliable transportation per [employer] and her family. Discussed skilled [address] and pt declined [address] need nor any other discharge needs. CM remains available if needs arise.

## 2020-08-27 VITALS
HEART RATE: 69 BPM | RESPIRATION RATE: 16 BRPM | SYSTOLIC BLOOD PRESSURE: 112 MMHG | WEIGHT: 159.4 LBS | OXYGEN SATURATION: 98 % | BODY MASS INDEX: 25.02 KG/M2 | DIASTOLIC BLOOD PRESSURE: 49 MMHG | HEIGHT: 67 IN | TEMPERATURE: 98.2 F

## 2020-08-27 LAB
CORTISOL, PLASMA: 11.6
EKG ATRIAL RATE: 59 BPM
EKG DIAGNOSIS: NORMAL
EKG P AXIS: 99 DEGREES
EKG P-R INTERVAL: 180 MS
EKG Q-T INTERVAL: 468 MS
EKG QRS DURATION: 80 MS
EKG QTC CALCULATION (BAZETT): 463 MS
EKG R AXIS: 26 DEGREES
EKG T AXIS: 98 DEGREES
EKG VENTRICULAR RATE: 59 BPM

## 2020-08-27 PROCEDURE — 6370000000 HC RX 637 (ALT 250 FOR IP): Performed by: HOSPITALIST

## 2020-08-27 PROCEDURE — 82533 TOTAL CORTISOL: CPT

## 2020-08-27 PROCEDURE — 94640 AIRWAY INHALATION TREATMENT: CPT

## 2020-08-27 PROCEDURE — G0378 HOSPITAL OBSERVATION PER HR: HCPCS

## 2020-08-27 PROCEDURE — 94761 N-INVAS EAR/PLS OXIMETRY MLT: CPT

## 2020-08-27 PROCEDURE — 2580000003 HC RX 258: Performed by: HOSPITALIST

## 2020-08-27 PROCEDURE — 6360000002 HC RX W HCPCS: Performed by: HOSPITALIST

## 2020-08-27 RX ORDER — BLOOD PRESSURE TEST KIT
1 KIT MISCELLANEOUS PRN
Qty: 1 KIT | Refills: 0 | Status: SHIPPED | OUTPATIENT
Start: 2020-08-27

## 2020-08-27 RX ORDER — ASPIRIN 81 MG/1
81 TABLET, CHEWABLE ORAL DAILY
Qty: 30 TABLET | Refills: 0 | Status: SHIPPED | OUTPATIENT
Start: 2020-08-28

## 2020-08-27 RX ORDER — MIDODRINE HYDROCHLORIDE 5 MG/1
5 TABLET ORAL
Qty: 90 TABLET | Refills: 0 | Status: SHIPPED | OUTPATIENT
Start: 2020-08-27

## 2020-08-27 RX ORDER — COSYNTROPIN 0.25 MG/ML
250 INJECTION, POWDER, FOR SOLUTION INTRAMUSCULAR; INTRAVENOUS ONCE
Status: COMPLETED | OUTPATIENT
Start: 2020-08-27 | End: 2020-08-27

## 2020-08-27 RX ORDER — MIDODRINE HYDROCHLORIDE 5 MG/1
5 TABLET ORAL
Status: DISCONTINUED | OUTPATIENT
Start: 2020-08-27 | End: 2020-08-27 | Stop reason: HOSPADM

## 2020-08-27 RX ADMIN — ENOXAPARIN SODIUM 40 MG: 40 INJECTION SUBCUTANEOUS at 08:50

## 2020-08-27 RX ADMIN — ASPIRIN 81 MG CHEWABLE TABLET 81 MG: 81 TABLET CHEWABLE at 08:50

## 2020-08-27 RX ADMIN — COSYNTROPIN 250 MCG: 0.25 INJECTION, POWDER, LYOPHILIZED, FOR SOLUTION INTRAMUSCULAR; INTRAVENOUS at 09:30

## 2020-08-27 RX ADMIN — MIDODRINE HYDROCHLORIDE 5 MG: 5 TABLET ORAL at 11:49

## 2020-08-27 RX ADMIN — IPRATROPIUM BROMIDE AND ALBUTEROL SULFATE 1 AMPULE: .5; 3 SOLUTION RESPIRATORY (INHALATION) at 08:35

## 2020-08-27 RX ADMIN — OXYBUTYNIN CHLORIDE 15 MG: 10 TABLET, EXTENDED RELEASE ORAL at 08:50

## 2020-08-27 RX ADMIN — ZONISAMIDE 200 MG: 100 CAPSULE ORAL at 08:51

## 2020-08-27 RX ADMIN — OXCARBAZEPINE 600 MG: 300 TABLET, FILM COATED ORAL at 06:31

## 2020-08-27 RX ADMIN — MIDODRINE HYDROCHLORIDE 5 MG: 5 TABLET ORAL at 08:51

## 2020-08-27 RX ADMIN — LEVETIRACETAM 1500 MG: 500 TABLET ORAL at 08:51

## 2020-08-27 RX ADMIN — SODIUM CHLORIDE, PRESERVATIVE FREE 10 ML: 5 INJECTION INTRAVENOUS at 08:51

## 2020-08-27 RX ADMIN — SERTRALINE HYDROCHLORIDE 100 MG: 100 TABLET ORAL at 08:50

## 2020-08-27 ASSESSMENT — PAIN DESCRIPTION - PROGRESSION
CLINICAL_PROGRESSION: NOT CHANGED

## 2020-08-27 ASSESSMENT — PAIN SCALES - GENERAL: PAINLEVEL_OUTOF10: 0

## 2020-08-27 NOTE — PROGRESS NOTES
Comprehensive Nutrition Assessment    Type and Reason for Visit:  Positive Nutrition Screen(wt loss, difficulty chewing)    Nutrition Recommendations/Plan:   · Continue current diet  · Begin low juani high protein oral nutrition supplement daily to optimize intake with current illness    Nutrition Assessment:  Pt screened positive for reported weight loss and chewing difficulty. Pt stated that she had slight weight loss and that she did not have difficulty consuming diet. Will order standard low calorie high protein supplement to optimize nutritional intake. Pt at low nutrition risk at this time. Malnutrition Assessment:  Malnutrition Status:  No malnutrition      Nutrition Related Findings:  Pt does not report trouble chewing or consuming diet. Blood Glucose has been high since admission (114, 126). No HbA1c on record. No hx of DM reported. Wounds:  None       Current Nutrition Therapies:    DIET GENERAL; No Caffeine    Anthropometric Measures:  · Height: 5' 6.5\" (168.9 cm)  · Current Body Weight: 159 lb 6.4 oz (72.3 kg)   · Admission Body Weight: 157 lb (71.2 kg)(Pt stated)    · Usual Body Weight:  n/a     · Ideal Body Weight: 133 lbs; % Ideal Body Weight 119.8 %   · BMI: 25.3  · BMI Categories: Overweight (BMI 25.0-29. 9)       Nutrition Diagnosis:   No nutrition diagnosis at this time     Nutrition Interventions:   Food and/or Nutrient Delivery:  Continue Current Diet, Start Oral Nutrition Supplement  Nutrition Education/Counseling:  Education not indicated   Coordination of Nutrition Care:  Continued Inpatient Monitoring    Goals:  Pt will consume 1 low juani high protein supplement and 75% of meals each day       Nutrition Monitoring and Evaluation:   Food/Nutrient Intake Outcomes:  Food and Nutrient Intake, Supplement Intake  Physical Signs/Symptoms Outcomes:  Chewing or Swallowing, Weight, Biochemical Data     Discharge Planning:    Continue Oral Nutrition Supplement     Electronically signed by

## 2020-08-27 NOTE — PROGRESS NOTES
Will discharge. Ordered cosyntropin test but noted baseline cortisol > 10 and then cosyntropin was given within 15min and no repeat cortisol noted. Unlikely adrenal insufficiency with her baseline cortisol > 10. Appears cortisol 30min and 60min which was ordered by myself was cancelled as a duplicate. Despite this she can be discharged home.

## 2020-08-27 NOTE — PROGRESS NOTES
Physician Progress Note      Km Mcqueen  [facility] #:                  [id number]  :                       1955  ADMIT DATE:       2020 5:38 PM  [address] [place] DATE:  RESPONDING  PROVIDER #:        Fredi Haq MD          QUERY TEXT:    Dear Attending,    Patient admitted with near syncope and dizziness. Noted in IM progress notes,   \". Yamilka Messing Yamilka Messing Syncope  -seizure in differential but she denies any episodes and complains more of   dizziness and noted severely orthostatic from  to 89. Yamilka Messing Yamilka Messing Bradycardia. Yamilka Messing Yamilka Messing \". If possible, please document in progress notes and discharge summary if you   are evaluating and/or treating any of the following: The medical record reflects the following:  Risk Factors: orthostatic BP's; history of seizures, hyponatremia  Clinical Indicators:  Na 130->132;  Ortho BP's 108/59, HR 60->99/70, HR   76->85/60, ;  Cortisol AM level 7.9, Plasma Cortisol 11.6;  lowest HR   recoded 49; per IM notes, \". ..patient states has dizziness when getting   up. ..complains more of dizziness and noted severely orthostatic from    to 89. Yamilka Messing Yamilka Messing \";  Treatment: cortisol testing; midodrine; IVF(now discontinued)  Options provided:  -- Syncope due to orthostatic hypotension secondary to, Please provide   etiology. -- Syncope due to bradycardia  -- Other - I will add my own diagnosis  -- Disagree - Not applicable / Not valid  -- Disagree - Clinically unable to determine / Unknown  -- Refer to Clinical Documentation Reviewer    PROVIDER RESPONSE TEXT:    The syncope is due to orthostatic hypotension secondary to medication and   dehydration.     Query created by: Julia Calabrese on 2020 12:20 PM      Electronically signed by:  Fredi Haq MD 2020 3:03 PM

## 2020-08-27 NOTE — PROGRESS NOTES
MD Lul   81 mg at 08/27/20 0850    enoxaparin (LOVENOX) injection 40 mg  40 mg Subcutaneous Daily Janes Howe MD   40 mg at 08/27/20 0850    oxyCODONE-acetaminophen (PERCOCET) 5-325 MG per tablet 1 tablet  1 tablet Oral Q6H PRN Janes Howe MD   1 tablet at 08/25/20 1819     No Known Allergies  Active Problems:    Chest pain  Resolved Problems:    * No resolved hospital problems. *    Blood pressure (!) 112/49, pulse 69, temperature 98.2 °F (36.8 °C), temperature source Oral, resp. rate 16, height 5' 6.5\" (1.689 m), weight 159 lb 6.4 oz (72.3 kg), SpO2 98 %. Subjective:  Symptoms:  Stable. Diet:  Adequate intake. Pain:  She reports no pain. Objective:  General Appearance:  Comfortable. Vital signs: (most recent): Blood pressure (!) 112/49, pulse 69, temperature 98.2 °F (36.8 °C), temperature source Oral, resp. rate 16, height 5' 6.5\" (1.689 m), weight 159 lb 6.4 oz (72.3 kg), SpO2 98 %. Vital signs are normal.  (Orthostatic ). HEENT: Normal HEENT exam.    Lungs:  Normal effort. Heart: Normal rate. Abdomen: Abdomen is soft. Bowel sounds are normal.     Extremities: Normal range of motion. Neurological: Patient is alert. Pupils:  Pupils are equal, round, and reactive to light. Skin:  Warm. Assessment & Plan  Chest pain  -serial CE, ASA, lipid, TSH wnl  -echo and stress test neg  -Consult cardio  Syncope   -seizure in differential but she denies any episodes and complains more of dizziness and noted severely orthostatic from  to 89  -cortisol and add midodrine  -monitor for any seizure like acitivity  Bradycardia  -TSH and echo wnl  Hyponatremia  -IVF and stable  Seizure  -zonisamide, trileptal, keppra  DVT prophylaxis  -lovenox    Discharge on midodrine and cortisol level repeat is > 10. Recommended to continue to hydrate self. OK for discharge.  Was not on right seizure medication dosages and adjusted and also on med rec list  Janes Howe MD  8/27/2020

## 2023-08-08 NOTE — DISCHARGE SUMMARY
Physician Discharge Summary     Patient ID:  Orlando Colón  [id number]  62 y.o.  1955    Admit date: 8/24/2020    Discharge date and time: No discharge date for patient encounter. Admitting Physician: Padma Wagner MD     Discharge Physician: Chula Jasso    Admission Diagnoses: Chest pain [R07.9]  Near syncope [R55]  Chest pain, unspecified type [R07.9]  Chest pain [R07.9]    Discharge Diagnoses: Chest pain                                          Syncope/dizziness due to orthostatic hypotension                                           Hx of seizure                                           Hyponatremia                                           bradycardia    Admission Condition: good    Discharged Condition: good    Indication for Admission: chest pain and syncope    Hospital Course:   62 y.o. female with significant past medical history of seizure and was recently placed on zonisamide 6months ago presents as she was shopping and then felt lightheaded and believed she passed out and her daughter called for help. Someone believed it was due to low blood sugar and gave her a blueberry muffin. NO history of diabetes. She then went home and started to have anterior chest pain with nausea and states lasted more then 30min. No pleuritic chest pain or palpitation. She went to [facility] and had a neg CTPE and transferred to [facility] for further work up. She was admitted for syncope and then later chest pain. She had no cardiac symptoms while hospitalized and echo and stress test was done and negative. TSH wnl. She was dizzy with syncope and unlikely seizure upon discussion with pt multiple times and reproducible dizziness upon standing as orthostatic with  from lying to 89 standing. She was hydrated as also mildly hyponatremic and midodrine was added which improved her symptoms. Cortisol level taken and not consistent with adrenal insufficiency.  Recommended to take bld pressure daily and get up slow from lying/sitting position and hydrate and use of compression stockings. She was discharged home. Outstanding Order Results     No orders found from 7/26/2020 to 8/25/2020. Discharge Exam:  HEENT: Normal HEENT exam.    Lungs:  Normal effort. Heart: Normal rate. Abdomen: Abdomen is soft. Bowel sounds are normal.     Extremities: Normal range of motion. Neurological: Patient is alert. Pupils:  Pupils are equal, round, and reactive to light. Skin:  Warm.       Disposition: home    Patient Instructions:      Medication List      START taking these medications    aspirin 81 MG chewable tablet  Take 1 tablet by mouth daily  Start taking on:  August 28, 2020     Blood Pressure Kit  1 kit by Does not apply route as needed (for orthostatic hypotension)     midodrine 5 MG tablet  Commonly known as:  PROAMATINE  Take 1 tablet by mouth 3 times daily (with meals) Hold if SBP > 120        CONTINUE taking these medications    alendronate 35 MG tablet  Commonly known as:  FOSAMAX     COMBIVENT IN     levETIRAcetam 750 MG tablet  Commonly known as:  KEPPRA     lovastatin 20 MG tablet  Commonly known as:  MEVACOR     OXcarbazepine 300 MG tablet  Commonly known as:  TRILEPTAL     oxybutynin 15 MG extended release tablet  Commonly known as:  DITROPAN XL     sertraline 100 MG tablet  Commonly known as:  ZOLOFT     zonisamide 100 MG capsule  Commonly known as:  Letty Valdez           Where to Get Your Medications      You can get these medications from any pharmacy    Bring a paper prescription for each of these medications  · aspirin 81 MG chewable tablet  · Blood Pressure Kit  · midodrine 5 MG tablet         Activity: activity as tolerated  Diet: regular diet  Wound Care: none needed    Follow-up with PCP  Discharge time 30min  Signed:  Karel Mccarty  8/27/2020  3:04 PM Statement Selected